# Patient Record
Sex: MALE | Race: BLACK OR AFRICAN AMERICAN | NOT HISPANIC OR LATINO | Employment: OTHER | URBAN - METROPOLITAN AREA
[De-identification: names, ages, dates, MRNs, and addresses within clinical notes are randomized per-mention and may not be internally consistent; named-entity substitution may affect disease eponyms.]

---

## 2021-07-01 ENCOUNTER — PATIENT MESSAGE (OUTPATIENT)
Dept: ADMINISTRATIVE | Facility: OTHER | Age: 61
End: 2021-07-01

## 2022-04-07 ENCOUNTER — HISTORICAL (OUTPATIENT)
Dept: ADMINISTRATIVE | Facility: HOSPITAL | Age: 62
End: 2022-04-07

## 2022-04-23 VITALS
BODY MASS INDEX: 29.29 KG/M2 | DIASTOLIC BLOOD PRESSURE: 88 MMHG | HEIGHT: 69 IN | WEIGHT: 197.75 LBS | SYSTOLIC BLOOD PRESSURE: 130 MMHG

## 2025-05-01 DIAGNOSIS — N50.89 TESTICULAR MASS: Primary | ICD-10-CM

## 2025-05-12 ENCOUNTER — OFFICE VISIT (OUTPATIENT)
Dept: UROLOGY | Facility: CLINIC | Age: 65
End: 2025-05-12
Payer: MEDICARE

## 2025-05-12 ENCOUNTER — LAB VISIT (OUTPATIENT)
Dept: LAB | Facility: HOSPITAL | Age: 65
End: 2025-05-12
Attending: UROLOGY
Payer: MEDICARE

## 2025-05-12 VITALS
DIASTOLIC BLOOD PRESSURE: 81 MMHG | HEIGHT: 69 IN | BODY MASS INDEX: 28.73 KG/M2 | HEART RATE: 70 BPM | SYSTOLIC BLOOD PRESSURE: 131 MMHG | RESPIRATION RATE: 19 BRPM | TEMPERATURE: 98 F | OXYGEN SATURATION: 100 % | WEIGHT: 194 LBS

## 2025-05-12 DIAGNOSIS — N40.0 BPH WITHOUT OBSTRUCTION/LOWER URINARY TRACT SYMPTOMS: ICD-10-CM

## 2025-05-12 DIAGNOSIS — N50.89 TESTICULAR MASS: Primary | ICD-10-CM

## 2025-05-12 DIAGNOSIS — R31.21 ASYMPTOMATIC MICROSCOPIC HEMATURIA: ICD-10-CM

## 2025-05-12 LAB
BILIRUB SERPL-MCNC: NORMAL MG/DL
BLOOD URINE, POC: NORMAL
COLOR, POC UA: YELLOW
GLUCOSE UR QL STRIP: NORMAL
KETONES UR QL STRIP: NORMAL
LEUKOCYTE ESTERASE URINE, POC: NORMAL
NITRITE, POC UA: NORMAL
PH, POC UA: 5.5
PROTEIN, POC: NORMAL
PSA SERPL-MCNC: 2 NG/ML
SPECIFIC GRAVITY, POC UA: 1.01
UROBILINOGEN, POC UA: 0.2

## 2025-05-12 PROCEDURE — 81001 URINALYSIS AUTO W/SCOPE: CPT | Mod: PBBFAC | Performed by: UROLOGY

## 2025-05-12 PROCEDURE — 3075F SYST BP GE 130 - 139MM HG: CPT | Mod: CPTII,,, | Performed by: UROLOGY

## 2025-05-12 PROCEDURE — 1160F RVW MEDS BY RX/DR IN RCRD: CPT | Mod: CPTII,,, | Performed by: UROLOGY

## 2025-05-12 PROCEDURE — 3079F DIAST BP 80-89 MM HG: CPT | Mod: CPTII,,, | Performed by: UROLOGY

## 2025-05-12 PROCEDURE — 1159F MED LIST DOCD IN RCRD: CPT | Mod: CPTII,,, | Performed by: UROLOGY

## 2025-05-12 PROCEDURE — 88108 CYTOPATH CONCENTRATE TECH: CPT | Mod: TC | Performed by: UROLOGY

## 2025-05-12 PROCEDURE — 84153 ASSAY OF PSA TOTAL: CPT

## 2025-05-12 PROCEDURE — 99214 OFFICE O/P EST MOD 30 MIN: CPT | Mod: PBBFAC,25 | Performed by: UROLOGY

## 2025-05-12 PROCEDURE — 99204 OFFICE O/P NEW MOD 45 MIN: CPT | Mod: S$PBB,,, | Performed by: UROLOGY

## 2025-05-12 PROCEDURE — 3008F BODY MASS INDEX DOCD: CPT | Mod: CPTII,,, | Performed by: UROLOGY

## 2025-05-12 PROCEDURE — 36415 COLL VENOUS BLD VENIPUNCTURE: CPT

## 2025-05-12 PROCEDURE — 4010F ACE/ARB THERAPY RXD/TAKEN: CPT | Mod: CPTII,,, | Performed by: UROLOGY

## 2025-05-12 RX ORDER — FAMOTIDINE 20 MG/1
20 TABLET, FILM COATED ORAL 2 TIMES DAILY
COMMUNITY
Start: 2025-04-24

## 2025-05-12 RX ORDER — AMLODIPINE BESYLATE 10 MG/1
10 TABLET ORAL DAILY
COMMUNITY
Start: 2025-04-24

## 2025-05-12 NOTE — PROGRESS NOTES
"CC:  Testicular mass    HPI:  Khai Bustamante Jr. is a 65 y.o. male being seen in consultation for testicular mass.  He has noticed a "knot" on top of the left testicle.  It has been present for a couple of months.  It is not painful unless it it touched a certain way.  He did have a scrotal ultrasound.  I can not find any PSA values on him.  He does not recall ever having had one before.  He does have microscopic hematuria that has been present for a while but is not significant today.  I do not have any previous urine values to compare.    Urinalysis:  Results for orders placed or performed in visit on 05/12/25   POCT URINE DIPSTICK WITH MICROSCOPE, AUTOMATED   Result Value Ref Range    Color, UA Yellow     Spec Grav UA 1.015     pH, UA 5.5     WBC, UA neg     Nitrite, UA neg     Protein, POC neg     Glucose, UA neg     Ketones, UA neg     Urobilinogen, UA 0.2     Bilirubin, POC neg     Blood, UA trace-Iysed      Microscopic Urinalysis:  WBC:   None per HPF     RBC:    0-1 per HPF     Bacteria:    None per HPF     Squamous epithelial cells:  None per HPF       Crystals:   None    Imaging:  Scrotal ultrasound - 30 April 2025:  The right testicular echotexture is mildly heterogenous felt to be within normal limits with no focal abnormality.  Epididymis is normal.  There is a moderate size hydrocele present on the right.  The echotexture is mildly heterogenous felt to be within normal limits of the left testicle.  There is a 4 mm simple appearing anechoic focus consistent with a cyst in the medial aspect of the midportion of the left testicle.    Data Review:  Note from referring provider, Libby Cody NP dated 1 May 2025.  Ultrasound.     ROS:  All systems reviewed and are negative except as documented in HPI and/or Assessment and Plan.     Patient Active Problem List:     Problem List[1]     Past Medical History:  History reviewed. No pertinent past medical history.     Past Surgical History:  Past Surgical " History:   Procedure Laterality Date    ADENOIDECTOMY          Family History:  Family History   Problem Relation Name Age of Onset    Hypertension Mother      Hypertension Father          Social History:  Social History     Socioeconomic History    Marital status:    Tobacco Use    Smoking status: Some Days    Smokeless tobacco: Never   Substance and Sexual Activity    Alcohol use: No    Drug use: No        Allergies:  Review of patient's allergies indicates:  No Known Allergies     Objective:  Vitals:    05/12/25 1116   BP: 131/81   Pulse: 70   Resp: 19   Temp: 98.2 °F (36.8 °C)     General:  Well developed, well nourished adult male in no acute distress  Abdomen: Soft, nontender, no masses  Extremities:  No clubbing, cyanosis, or edema  Neurologic:  Grossly intact  Musculoskeletal:  Normal tone  Penis:  Circumcised, no lesions  Scrotum:  Right hydrocele is present.  Testicles:  Nontender, no masses  Epididymis:  Normal without masses    Assessment:  1. Testicular mass  - Ambulatory referral/consult to Urology  - POCT URINE DIPSTICK WITH MICROSCOPE, AUTOMATED    2. Asymptomatic microscopic hematuria  - Cytology, Urine    3. BPH without obstruction/lower urinary tract symptoms  - PSA, Total (Diagnostic); Future     Plan:  There is no scrotal abnormality to account for what the patient is feeling.  The exam and the ultrasound are completely normal.  I think that he possibly may be feeling the epididymis.  Urine cytology was sent today.  If this persists at his next visit he may need a complete workup.  PSA today.    Follow-up tests needed:   None     Return appointment:  Six months for urinalysis.                   [1] There is no problem list on file for this patient.

## 2025-05-14 ENCOUNTER — RESULTS FOLLOW-UP (OUTPATIENT)
Dept: UROLOGY | Facility: CLINIC | Age: 65
End: 2025-05-14

## 2025-05-14 ENCOUNTER — TELEPHONE (OUTPATIENT)
Dept: UROLOGY | Facility: CLINIC | Age: 65
End: 2025-05-14
Payer: MEDICARE

## 2025-05-14 LAB
ESTROGEN SERPL-MCNC: NORMAL PG/ML
INSULIN SERPL-ACNC: NORMAL U[IU]/ML
LAB AP CLINICAL INFORMATION: NORMAL
LAB AP GROSS DESCRIPTION: NORMAL
LAB AP URINE CYTOLOGY INTERPRETATION SPECIMEN 1: NORMAL

## 2025-05-14 NOTE — TELEPHONE ENCOUNTER
Spoke to patient about PSA and cytology results; patient voiced understanding. Informed pt that if he has any additional questions to let staff at clinic know.       Recent Labs     05/12/25  1249   PSA 2.00